# Patient Record
(demographics unavailable — no encounter records)

---

## 2024-10-26 NOTE — ASSESSMENT
[FreeTextEntry1] : Assessment: Onychomycosis caused by T. Rubrum.  Plan: All toenails were debrided mechanically and via electric grinding.  All toenails were trimmed with a 14 cm sterile stainless steel box lock double spring nail splitter.  Then utilizing a sterile pear shaped celso humaira (this device falls under bur, surgical, general & plastic surgery.  The FDA deems this item a Class-1 device) via a 35,000 RPM electric drill and vacuum and dust extractor system all toenails were aseptically debrided removing fungal layers.  This is done to diminish the fungal load of the toenails and enhance the effects of the antifungal medication, allowing overall improvement in the degree of fungal infection as well as improve appearance and reduce discomfort and help diminish chances of secondary bacterial infection, also lessening the chance of ingrown nails, especially when performed on a regular basis.  The patient was encouraged to continue with the topical antifungal medication everyday and use the Clean Sweep antifungal spray to disinfect their shoes.  PTR: 2 months.

## 2024-10-26 NOTE — HISTORY OF PRESENT ILLNESS
[FreeTextEntry1] : The patient returned to the office with a chief complaint of toenails that were difficult to cut and causing pain on toes 1, 2, 3, 4, 5 right foot and 1, 2, 3, 4, and 5 left foot.  She said that certain shoes aggravate her toes more than others. She stated that great relief is achieved from these treatments and without such care they would cause her additional pain, causing marked limitation of walking.  Her  was in attendance for the visit.

## 2024-11-23 NOTE — HISTORY OF PRESENT ILLNESS
[FreeTextEntry1] : The patient presented to the office with a complaint of fungal nails that are irritated especially when her toes rub against the shoes.  She said that this care of the toenails enables her to walk without pain and without it she would have a greatly limited ability to walk.  Her  was in attendance.

## 2024-11-23 NOTE — ASSESSMENT
[FreeTextEntry1] : Assessment: Onychomycosis caused by T. Rubrum.  Plan: Aseptic debridement was performed on all toenails utilizing electric burring and mechanical debridement.  All toenails were trimmed with a 14 cm sterile stainless steel box lock double spring nail splitter.  Then utilizing a sterile pear shaped celso humaira (this device falls under bur, surgical, general & plastic surgery.  The FDA deems this item a Class-1 device) via a 35,000 RPM electric drill and vacuum and dust extractor system all toenails were aseptically debrided removing fungal layers.  This is done to diminish the fungal load of the toenails and enhance the effects of the antifungal medication, allowing overall improvement in the degree of fungal infection as well as improve appearance and reduce discomfort and help diminish chances of secondary bacterial infection, also lessening the chance of ingrown nails, especially when performed on a regular basis.  The patient was encouraged to continue with the topical antifungal medication everyday and use the Clean Sweep antifungal spray to disinfect their shoes..  PTR: 2 months.

## 2025-02-01 NOTE — ASSESSMENT
[FreeTextEntry1] : Assessment: Onychomycosis caused by T. Rubrum.  Plan: I debrided each toenail via mechanical trimming and electrical grinding.  All toenails were trimmed with a 14 cm sterile stainless steel box lock double spring nail splitter.  Then utilizing a sterile pear shaped celso humaira (this device falls under bur, surgical, general & plastic surgery.  The FDA deems this item a Class-1 device) via a 35,000 RPM electric drill and vacuum and dust extractor system all toenails were aseptically debrided removing fungal layers.  This is done to diminish the fungal load of the toenails and enhance the effects of the antifungal medication, allowing overall improvement in the degree of fungal infection as well as improve appearance and reduce discomfort and help diminish chances of secondary bacterial infection, also lessening the chance of ingrown nails, especially when performed on a regular basis.  The patient was encouraged to continue with the topical antifungal medication everyday and use the Clean Sweep antifungal spray to disinfect their shoes. She was encouraged to call the office with any questions or problems as they may arise.  PTR: 2 months.

## 2025-02-01 NOTE — HISTORY OF PRESENT ILLNESS
[FreeTextEntry1] : The patient returned to the office stating that her toenails were hurting.  She stated that they feel better after being treated here.  The patient said it is sore on toes 1, 2, 3, 4 and 5 both feet.  The application of the medication was being done.  Aislinn Miguel returns with her , Dr. Cisneros,

## 2025-03-24 NOTE — ASSESSMENT
[FreeTextEntry1] : US results reviewed. No intervention at this time. Follow up in 1-2 years for repeat Renal US.  Patient wishes to come back next year for US.

## 2025-03-24 NOTE — HISTORY OF PRESENT ILLNESS
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 No new complaints since her last visit. Here for one year evaluation.  US in office performed today. Images reviewed. Findings: A vascular echogenic lesion consistent with AML visualized in the middle pole of the right kidney measuring 1.0 x 1.2 x 1.1 cm is smaller in size (previously measured 1.3 x 1.4 x 1.6 cm in the right upper pole). There is a sub-centimeter non-vascular cyst visualized in the left upper pole kidney. Both kidneys are normal in size and echogenicity without obvious stones, hydronephrosis or solid masses visualized.  No urinary complaints. No hematuria or flank pain. Scheduled to have pacemaker placed next year.

## 2025-06-30 NOTE — ASSESSMENT
[FreeTextEntry1] : Assessment: Onychomycosis caused by T. Rubrum.  Plan: The toenails 1 - 5 on both feet were debrided mechanically and then were electrically burred to a more normal appearance to reduce the fungal load and allow the antifungal medication to work more effectively.  All toenails were trimmed with a 14 cm sterile stainless steel box lock double spring nail splitter.  Then utilizing a sterile pear shaped celso humaira (this device falls under bur, surgical, general & plastic surgery.  The FDA deems this item a Class-1 device) via a 35,000 RPM electric drill and vacuum and dust extractor system all toenails were aseptically debrided removing fungal layers.  This is done to diminish the fungal load of the toenails and enhance the effects of the antifungal medication, allowing overall improvement in the degree of fungal infection as well as improve appearance and reduce discomfort and help diminish chances of secondary bacterial infection, also lessening the chance of ingrown nails, especially when performed on a regular basis.  The patient was instructed to apply the topical antifungal medication twice per day everyday and use the Clean Sweep antifungal spray to disinfect their shoes. She was advised to call the office at any time with any problems or questions.  PTR: 2 months.

## 2025-06-30 NOTE — HISTORY OF PRESENT ILLNESS
[FreeTextEntry1] : Aislinn Rivera returns with her , Blayne.  The patient returns to the office with a chief complaint of tenderness and pain on the toenails of toes 1, 2 3, 4 and 5 left foot and 1, 2, 3, 4 and 5 right foot.  The patient states that when she walks the pain gets worse.  Certain shoes are more bothersome than others. An updated medical history did not reveal any changes.  She seemed to be more oriented towards time and place at this visit.

## 2025-07-16 NOTE — REASON FOR VISIT
[Follow - Up] : a follow-up visit [Thyroid nodule/ MNG] : thyroid nodule/ MNG [Other___] : [unfilled] [Spouse] : spouse

## 2025-07-16 NOTE — DATA REVIEWED
[FreeTextEntry1] : Thyroid sono 11/13/2023 COMPARISON: Thyroid ultrasound dated 5/16/2022 and 6/20/2021. FINDINGS: Right Lobe: 3.0 cm x 1.3 cm x 1.1 cm. The gland is mildly heterogeneous in echotexture. There is 5 mm benign colloid cyst in the midpole (TIRAD -1). Left Lobe: 3.1 cm x 1.2 cm x 1.5 cm. The gland is mildly heterogeneous in echotexture. There are scattered benign-appearing spongiform like nodules and colloid cysts including: 1.4 x 1.1 x 0.8 cm spongiform benign-appearing nodule in the upper pole, not significantly changed. (TIRAD-1). 0.9 x 0.6 x 0.6 cm spongiform benign-appearing nodule in the lower pole, not significantly changed. (TIRAD-1). Isthmus: 1 mm. Cervical Lymph Nodes: No enlarged or abnormal morphology cervical nodes. IMPRESSION:Interval stability.Bilateral benign-appearing thyroid nodules, not significantly changed. TI-RAD 1: Benign (No FNA)  Thyroid sonogram 9/5/24 Right Lobe: 3.1 x 1.3 x 1.2 cm. Normal in size. Mildly heterogeneous echotexture with scattered benign-appearing colloid cysts measuring up to 4 mm, unchanged. There are no suspicious lesions (TIRAD -1). Left Lobe: 3.4 x 1.4 x 1.3 cm. Normal in size. Mildly heterogeneous echotexture with scattered benign-appearing spongiform like nodules including: -1.4 x 0.9 x 0.8 cm spongiform benign-appearing nodule in the upper pole, unchanged. (TIRAD-1). -9 x 7 x 6 mm spongiform benign-appearing nodule in the lower pole, unchanged. (TIRAD-1). Isthmus: 2 mm. Cervical Lymph Nodes: No enlarged or abnormal morphology cervical nodes. IMPRESSION:Interval stability.Bilateral benign-appearing thyroid nodules, not significantly changed.TI-RAD 1: Benign (No FNA)

## 2025-07-16 NOTE — REVIEW OF SYSTEMS
[As Noted in HPI] : as noted in HPI [Blurred Vision] : no blurred vision [Chest Pain] : no chest pain [Palpitations] : no palpitations [Shortness Of Breath] : no shortness of breath [SOB on Exertion] : no shortness of breath on exertion [Nausea] : no nausea [Constipation] : no constipation [Abdominal Pain] : no abdominal pain [Diarrhea] : no diarrhea [Depression] : no depression [Anxiety] : no anxiety [FreeTextEntry2] : weight stable [FreeTextEntry9] : occ low back pains when waking up in am

## 2025-07-16 NOTE — ASSESSMENT
[FreeTextEntry1] : 82 yr old female 1. bilateral, small thryoid nodules -all TiRAD 1, stable since 2018 without suspicous findings and she is asymptomatic, TSH normal - repeat sono as needed  2. B12 def- replete, cont OTC B12  3. Vit D def- replete, cont OTC Vit D  4. Bone Health (+)Osteoporosis, has been on Prolia by Rheum, DXA monitored by Rheum

## 2025-07-16 NOTE — HISTORY OF PRESENT ILLNESS
[FreeTextEntry1] : Interval hx - Dx with Afib s/p PPM, now on beta blocker and blood thinner no complaints today denies anterior neck pain, dysphagia or voice changes takes OTC vit D and B12 supplements  sees PCP, Dr Omkar Hawkins

## 2025-07-16 NOTE — PHYSICAL EXAM
[Alert] : alert [No Acute Distress] : no acute distress [EOMI] : extra ocular movement intact [Thyroid Not Enlarged] : the thyroid was not enlarged [No Thyroid Nodules] : no palpable thyroid nodules [Clear to Auscultation] : lungs were clear to auscultation bilaterally [Normal S1, S2] : normal S1 and S2 [Normal Rate] : heart rate was normal [Abdominal Aorta Normal] : the abdominal aorta was normal [Not Tender] : non-tender [Soft] : abdomen soft [Normal Gait] : normal gait [Oriented x3] : oriented to person, place, and time [Normal Affect] : the affect was normal [Normal Insight/Judgement] : insight and judgment were intact [Normal Mood] : the mood was normal [Normal Rate and Effort] : normal respiratory rate and effort [Kyphosis] : no kyphosis present [Scoliosis] : no scoliosis